# Patient Record
Sex: FEMALE | Race: BLACK OR AFRICAN AMERICAN
[De-identification: names, ages, dates, MRNs, and addresses within clinical notes are randomized per-mention and may not be internally consistent; named-entity substitution may affect disease eponyms.]

---

## 2024-07-16 PROBLEM — Z00.00 ENCOUNTER FOR PREVENTIVE HEALTH EXAMINATION: Status: ACTIVE | Noted: 2024-07-16

## 2024-07-22 ENCOUNTER — APPOINTMENT (OUTPATIENT)
Dept: OBGYN | Facility: CLINIC | Age: 34
End: 2024-07-22
Payer: COMMERCIAL

## 2024-07-22 ENCOUNTER — ASOB RESULT (OUTPATIENT)
Age: 34
End: 2024-07-22

## 2024-07-22 VITALS
HEIGHT: 63 IN | WEIGHT: 143 LBS | SYSTOLIC BLOOD PRESSURE: 117 MMHG | DIASTOLIC BLOOD PRESSURE: 84 MMHG | HEART RATE: 82 BPM | BODY MASS INDEX: 25.34 KG/M2

## 2024-07-22 DIAGNOSIS — Z80.41 FAMILY HISTORY OF MALIGNANT NEOPLASM OF OVARY: ICD-10-CM

## 2024-07-22 DIAGNOSIS — Z32.01 ENCOUNTER FOR PREGNANCY TEST, RESULT POSITIVE: ICD-10-CM

## 2024-07-22 DIAGNOSIS — Z80.3 FAMILY HISTORY OF MALIGNANT NEOPLASM OF BREAST: ICD-10-CM

## 2024-07-22 PROCEDURE — 99204 OFFICE O/P NEW MOD 45 MIN: CPT

## 2024-07-22 PROCEDURE — 76817 TRANSVAGINAL US OBSTETRIC: CPT

## 2024-07-22 RX ORDER — PRENATAL VIT 49/IRON FUM/FOLIC 6.75-0.2MG
TABLET ORAL
Refills: 0 | Status: ACTIVE | COMMUNITY

## 2024-07-22 NOTE — PLAN
[FreeTextEntry1] : 34 y/o  at 7w2d (YENI 3/8/25 by LMP c/w 1st trimester sono) for initial prenatal visit  Plan: - Welcome letter provided - Information for patient portal given - 1st OB labs collected; urine culture, GC/CT collected - recommended patient bring records for pap, colpo results - Screening options, including NIPS and NT reviewed with patient - Cord blood collection discussed - Early pregnancy precautions given - Continue PNV - ASA for PEC ppx - Small frequent meals; vitamin B6 and unisom/benadryl for nausea - RTO in 4 weeks for NIPS/NT

## 2024-07-22 NOTE — PHYSICAL EXAM
[Chaperone Present] : A chaperone was present in the examining room during all aspects of the physical examination [[id number]] : A chaperone was present during the pelvic exam. [Appropriately responsive] : appropriately responsive [Alert] : alert [No Acute Distress] : no acute distress [Regular Rate Rhythm] : regular rate rhythm [Soft] : soft [Non-tender] : non-tender [Non-distended] : non-distended [No Lesions] : no lesions [Oriented x3] : oriented x3 [FreeTextEntry5] : non labored breathing [Normal] : normal [Examination Of The Breasts] : a normal appearance [No Masses] : no breast masses were palpable

## 2024-07-22 NOTE — HISTORY OF PRESENT ILLNESS
[FreeTextEntry1] : 32 y/o  LMP 22 presents with missed menses and positive pregnancy test. TVUS confirms SIUP at 7w2d with YENI 3/8/25, consistent with LMP. Reports some nausea, denies cramping, bleeding.  OB: eTOP x1 GYN: h/o abnormal pap, several colposcopy, all wnl, most recent  PMH: denies PSH: denies FH: breast ca (mother) Meds: PNV Allergies: NKDA Social: Denies toxic habits

## 2024-07-23 LAB
ABO + RH PNL BLD: NORMAL
BASOPHILS # BLD AUTO: 0.02 K/UL
BASOPHILS NFR BLD AUTO: 0.3 %
BLD GP AB SCN SERPL QL: NORMAL
C TRACH RRNA SPEC QL NAA+PROBE: NOT DETECTED
EOSINOPHIL # BLD AUTO: 0.05 K/UL
EOSINOPHIL NFR BLD AUTO: 0.7 %
ESTIMATED AVERAGE GLUCOSE: 114 MG/DL
HBA1C MFR BLD HPLC: 5.6 %
HBV SURFACE AG SER QL: NONREACTIVE
HCT VFR BLD CALC: 36.7 %
HCV AB SER QL: NONREACTIVE
HCV S/CO RATIO: 0.15 S/CO
HGB A MFR BLD: 97.4 %
HGB A2 MFR BLD: 2.6 %
HGB BLD-MCNC: 12.5 G/DL
HGB FRACT BLD-IMP: NORMAL
HIV1+2 AB SPEC QL IA.RAPID: NONREACTIVE
IMM GRANULOCYTES NFR BLD AUTO: 0.3 %
LEAD BLD-MCNC: <1 UG/DL
LYMPHOCYTES # BLD AUTO: 2.19 K/UL
LYMPHOCYTES NFR BLD AUTO: 30.5 %
MAN DIFF?: NORMAL
MCHC RBC-ENTMCNC: 30.6 PG
MCHC RBC-ENTMCNC: 34.1 GM/DL
MCV RBC AUTO: 89.7 FL
MEV IGG FLD QL IA: 20.9 AU/ML
MEV IGG+IGM SER-IMP: POSITIVE
MONOCYTES # BLD AUTO: 0.62 K/UL
MONOCYTES NFR BLD AUTO: 8.6 %
N GONORRHOEA RRNA SPEC QL NAA+PROBE: NOT DETECTED
NEUTROPHILS # BLD AUTO: 4.28 K/UL
NEUTROPHILS NFR BLD AUTO: 59.6 %
PLATELET # BLD AUTO: 283 K/UL
RBC # BLD: 4.09 M/UL
RBC # FLD: 11.7 %
RUBV IGG FLD-ACNC: 0.28 INDEX
RUBV IGG SER-IMP: NEGATIVE
SOURCE AMPLIFICATION: NORMAL
VZV AB TITR SER: POSITIVE
VZV IGG SER IF-ACNC: 864.7 INDEX
WBC # FLD AUTO: 7.18 K/UL

## 2024-07-24 LAB
BACTERIA UR CULT: NORMAL
T PALLIDUM AB SER QL IA: NEGATIVE

## 2024-07-30 ENCOUNTER — NON-APPOINTMENT (OUTPATIENT)
Age: 34
End: 2024-07-30

## 2024-07-31 ENCOUNTER — NON-APPOINTMENT (OUTPATIENT)
Age: 34
End: 2024-07-31

## 2024-07-31 LAB
AR GENE MUT ANL BLD/T: ABNORMAL
CFTR MUT TESTED BLD/T: NEGATIVE
FMR1 GENE MUT ANL BLD/T: NORMAL

## 2024-08-22 ENCOUNTER — APPOINTMENT (OUTPATIENT)
Dept: ANTEPARTUM | Facility: CLINIC | Age: 34
End: 2024-08-22
Payer: COMMERCIAL

## 2024-08-22 ENCOUNTER — ASOB RESULT (OUTPATIENT)
Age: 34
End: 2024-08-22

## 2024-08-22 ENCOUNTER — APPOINTMENT (OUTPATIENT)
Dept: OBGYN | Facility: CLINIC | Age: 34
End: 2024-08-22
Payer: COMMERCIAL

## 2024-08-22 VITALS
HEIGHT: 63 IN | SYSTOLIC BLOOD PRESSURE: 108 MMHG | HEART RATE: 82 BPM | WEIGHT: 146 LBS | BODY MASS INDEX: 25.87 KG/M2 | DIASTOLIC BLOOD PRESSURE: 74 MMHG

## 2024-08-22 DIAGNOSIS — Z34.01 ENCOUNTER FOR SUPERVISION OF NORMAL FIRST PREGNANCY, FIRST TRIMESTER: ICD-10-CM

## 2024-08-22 PROCEDURE — 76813 OB US NUCHAL MEAS 1 GEST: CPT

## 2024-08-22 PROCEDURE — 0500F INITIAL PRENATAL CARE VISIT: CPT

## 2024-08-22 PROCEDURE — 76801 OB US < 14 WKS SINGLE FETUS: CPT

## 2024-09-26 ENCOUNTER — NON-APPOINTMENT (OUTPATIENT)
Age: 34
End: 2024-09-26

## 2024-09-26 ENCOUNTER — LABORATORY RESULT (OUTPATIENT)
Age: 34
End: 2024-09-26

## 2024-09-26 ENCOUNTER — APPOINTMENT (OUTPATIENT)
Dept: OBGYN | Facility: CLINIC | Age: 34
End: 2024-09-26
Payer: COMMERCIAL

## 2024-09-26 VITALS
HEIGHT: 63 IN | SYSTOLIC BLOOD PRESSURE: 105 MMHG | DIASTOLIC BLOOD PRESSURE: 70 MMHG | BODY MASS INDEX: 26.58 KG/M2 | WEIGHT: 150 LBS | HEART RATE: 90 BPM

## 2024-09-26 VITALS — HEIGHT: 63 IN

## 2024-09-26 DIAGNOSIS — Z34.02 ENCOUNTER FOR SUPERVISION OF NORMAL FIRST PREGNANCY, SECOND TRIMESTER: ICD-10-CM

## 2024-09-26 PROCEDURE — 0502F SUBSEQUENT PRENATAL CARE: CPT
